# Patient Record
Sex: FEMALE | Race: WHITE
[De-identification: names, ages, dates, MRNs, and addresses within clinical notes are randomized per-mention and may not be internally consistent; named-entity substitution may affect disease eponyms.]

---

## 2017-09-05 ENCOUNTER — HOSPITAL ENCOUNTER (OUTPATIENT)
Dept: HOSPITAL 75 - ER | Age: 22
LOS: 2 days | Discharge: HOME | End: 2017-09-07
Attending: SURGERY
Payer: COMMERCIAL

## 2017-09-05 ENCOUNTER — HOSPITAL ENCOUNTER (OUTPATIENT)
Dept: HOSPITAL 75 - LAB | Age: 22
End: 2017-09-05
Attending: NURSE PRACTITIONER
Payer: COMMERCIAL

## 2017-09-05 VITALS — HEIGHT: 66 IN | WEIGHT: 293 LBS | BODY MASS INDEX: 47.09 KG/M2

## 2017-09-05 DIAGNOSIS — E66.01: ICD-10-CM

## 2017-09-05 DIAGNOSIS — R10.31: ICD-10-CM

## 2017-09-05 DIAGNOSIS — R10.9: Primary | ICD-10-CM

## 2017-09-05 DIAGNOSIS — D50.9: ICD-10-CM

## 2017-09-05 DIAGNOSIS — K44.9: Primary | ICD-10-CM

## 2017-09-05 DIAGNOSIS — E03.9: ICD-10-CM

## 2017-09-05 LAB
%HYPO/RBC NFR BLD AUTO: SLIGHT %
ALBUMIN SERPL-MCNC: 4 GM/DL (ref 3.2–4.5)
ALT SERPL-CCNC: 23 U/L (ref 0–55)
ANION GAP SERPL CALC-SCNC: 13 MMOL/L (ref 5–14)
ANISOCYTOSIS BLD QL SMEAR: SLIGHT
AST SERPL-CCNC: 16 U/L (ref 5–34)
BASOPHILS # BLD AUTO: 0 10^3/UL (ref 0–0.1)
BASOPHILS # BLD AUTO: 0 10^3/UL (ref 0–0.1)
BASOPHILS NFR BLD AUTO: 0 % (ref 0–10)
BASOPHILS NFR BLD AUTO: 0 % (ref 0–10)
BASOPHILS NFR BLD MANUAL: 0 %
BILIRUB SERPL-MCNC: 0.4 MG/DL (ref 0.1–1)
BILIRUB UR QL STRIP: NEGATIVE
BUN SERPL-MCNC: 11 MG/DL (ref 7–18)
BUN/CREAT SERPL: 14
CALCIUM SERPL-MCNC: 9.4 MG/DL (ref 8.5–10.1)
CHLORIDE SERPL-SCNC: 107 MMOL/L (ref 98–107)
CO2 SERPL-SCNC: 19 MMOL/L (ref 21–32)
CREAT SERPL-MCNC: 0.76 MG/DL (ref 0.6–1.3)
EOSINOPHIL # BLD AUTO: 0.2 10^3/UL (ref 0–0.3)
EOSINOPHIL # BLD AUTO: 0.2 10^3/UL (ref 0–0.3)
EOSINOPHIL NFR BLD AUTO: 1 % (ref 0–10)
EOSINOPHIL NFR BLD AUTO: 1 % (ref 0–10)
EOSINOPHIL NFR BLD MANUAL: 0 %
ERYTHROCYTE [DISTWIDTH] IN BLOOD BY AUTOMATED COUNT: 18.3 % (ref 10–14.5)
ERYTHROCYTE [DISTWIDTH] IN BLOOD BY AUTOMATED COUNT: 18.5 % (ref 10–14.5)
GFR SERPLBLD BASED ON 1.73 SQ M-ARVRAT: > 60 ML/MIN
GLUCOSE SERPL-MCNC: 91 MG/DL (ref 70–105)
KETONES UR QL STRIP: NEGATIVE
LEUKOCYTE ESTERASE UR QL STRIP: NEGATIVE
LYMPHOCYTES # BLD AUTO: 3.3 X 10^3 (ref 1–4)
LYMPHOCYTES # BLD AUTO: 4.2 X 10^3 (ref 1–4)
LYMPHOCYTES NFR BLD AUTO: 28 % (ref 12–44)
LYMPHOCYTES NFR BLD AUTO: 31 % (ref 12–44)
MCH RBC QN AUTO: 19 PG (ref 25–34)
MCH RBC QN AUTO: 19 PG (ref 25–34)
MCHC RBC AUTO-ENTMCNC: 30 G/DL (ref 32–36)
MCHC RBC AUTO-ENTMCNC: 30 G/DL (ref 32–36)
MCV RBC AUTO: 62 FL (ref 80–99)
MCV RBC AUTO: 63 FL (ref 80–99)
MICROCYTES BLD QL SMEAR: (no result)
MONOCYTES # BLD AUTO: 0.6 X 10^3 (ref 0–1)
MONOCYTES # BLD AUTO: 0.7 X 10^3 (ref 0–1)
MONOCYTES NFR BLD AUTO: 5 % (ref 0–12)
MONOCYTES NFR BLD AUTO: 5 % (ref 0–12)
NEUTROPHILS # BLD AUTO: 7.8 X 10^3 (ref 1.8–7.8)
NEUTROPHILS # BLD AUTO: 8.3 X 10^3 (ref 1.8–7.8)
NEUTROPHILS NFR BLD AUTO: 62 % (ref 42–75)
NEUTROPHILS NFR BLD AUTO: 65 % (ref 42–75)
NEUTS BAND NFR BLD MANUAL: 70 %
NEUTS BAND NFR BLD: 0 %
NITRITE UR QL STRIP: NEGATIVE
PH UR STRIP: 6 [PH] (ref 5–9)
PLATELET # BLD: 597 10^3/UL (ref 130–400)
PLATELET # BLD: 607 10^3/UL (ref 130–400)
PMV BLD AUTO: 8.9 FL (ref 7.4–10.4)
PMV BLD AUTO: 9.3 FL (ref 7.4–10.4)
POTASSIUM SERPL-SCNC: 3.8 MMOL/L (ref 3.6–5)
PROT SERPL-MCNC: 7.5 GM/DL (ref 6.4–8.2)
PROT UR QL STRIP: NEGATIVE
RBC # BLD AUTO: 4.79 10^6/UL (ref 4.35–5.85)
RBC # BLD AUTO: 4.82 10^6/UL (ref 4.35–5.85)
SODIUM SERPL-SCNC: 139 MMOL/L (ref 135–145)
SP GR UR STRIP: 1.01 (ref 1.02–1.02)
UROBILINOGEN UR-MCNC: NORMAL MG/DL
VARIANT LYMPHS NFR BLD MANUAL: 27 %
WBC # BLD AUTO: 11.9 10^3/UL (ref 4.3–11)
WBC # BLD AUTO: 13.4 10^3/UL (ref 4.3–11)
WBC #/AREA URNS HPF: (no result) /HPF

## 2017-09-05 PROCEDURE — 85027 COMPLETE CBC AUTOMATED: CPT

## 2017-09-05 PROCEDURE — 82728 ASSAY OF FERRITIN: CPT

## 2017-09-05 PROCEDURE — 83540 ASSAY OF IRON: CPT

## 2017-09-05 PROCEDURE — 96374 THER/PROPH/DIAG INJ IV PUSH: CPT

## 2017-09-05 PROCEDURE — 74177 CT ABD & PELVIS W/CONTRAST: CPT

## 2017-09-05 PROCEDURE — 85025 COMPLETE CBC W/AUTO DIFF WBC: CPT

## 2017-09-05 PROCEDURE — 82607 VITAMIN B-12: CPT

## 2017-09-05 PROCEDURE — 36415 COLL VENOUS BLD VENIPUNCTURE: CPT

## 2017-09-05 PROCEDURE — 82274 ASSAY TEST FOR BLOOD FECAL: CPT

## 2017-09-05 PROCEDURE — 80048 BASIC METABOLIC PNL TOTAL CA: CPT

## 2017-09-05 PROCEDURE — 81000 URINALYSIS NONAUTO W/SCOPE: CPT

## 2017-09-05 PROCEDURE — 76830 TRANSVAGINAL US NON-OB: CPT

## 2017-09-05 PROCEDURE — 80053 COMPREHEN METABOLIC PANEL: CPT

## 2017-09-05 PROCEDURE — 83690 ASSAY OF LIPASE: CPT

## 2017-09-05 PROCEDURE — 85007 BL SMEAR W/DIFF WBC COUNT: CPT

## 2017-09-05 PROCEDURE — 85045 AUTOMATED RETICULOCYTE COUNT: CPT

## 2017-09-05 PROCEDURE — 82746 ASSAY OF FOLIC ACID SERUM: CPT

## 2017-09-05 PROCEDURE — 82150 ASSAY OF AMYLASE: CPT

## 2017-09-05 PROCEDURE — 84703 CHORIONIC GONADOTROPIN ASSAY: CPT

## 2017-09-05 NOTE — XMS REPORT
Jorge Carson MD

 Created on: 2016



Vivian Crandall

External Reference #: [id number]

: 1995

Sex: Female



Demographics







 Address  [address] [place] [address]  [address]

 

 Home Phone  [phone]

 

 Preferred Language  Unknown

 

 Marital Status  Unknown

 

 Jew Affiliation  Unknown

 

 Race  White

 

 Ethnic Group  Not  or 





Author







 Author  Jorge Carson

 

 Organization  [employer]

 

 Address  Unknown

 

 Phone  Unavailable







Care Team Providers







 Care Team Member Name  Role  Phone

 

 Jorge Carson  CP  Unavailable



                                                                



Allergies

          No Known Allergies                                                   
                                     



Problems

          





 Problem Type  Condition  Code  Onset Dates  Condition Status

 

 Problem  Lumbar sprain and strain  847.2     Active



                                                                               
         



Medications

          





 Medication  Code System  Code  Instructions  Start Date  End Date  Status  
Dosage

 

 Fabiola  NDC  [address]-23  0.5 % Externally once  
     apply as directed



                                                                              



Results

          No Known Results                                                     
               



Summary Purpose

          [employer] Submission

## 2017-09-05 NOTE — XMS REPORT
Jorge Carson MD

 Created on: 2016



Vivian Crandall

External Reference #: [id number]

: 1995

Sex: Female



Demographics







 Address  [address] [place] [address]  [address]

 

 Home Phone  [phone]

 

 Preferred Language  Unknown

 

 Marital Status  Unknown

 

 Yazidism Affiliation  Unknown

 

 Race  White

 

 Ethnic Group  Not  or 





Author







 Author  Jorge Carson

 

 Organization  [employer]

 

 Address  Unknown

 

 Phone  Unavailable







Care Team Providers







 Care Team Member Name  Role  Phone

 

 Jorge Carson  CP  Unavailable



                                                                



Allergies

          No Known Allergies                                                   
                                     



Problems

          





 Problem Type  Condition  Code  Onset Dates  Condition Status

 

 Problem  Lumbar sprain and strain  847.2     Active



                                                                               
         



Medications

          





 Medication  Code System  Code  Instructions  Start Date  End Date  Status  
Dosage

 

 Fabiola  NDC  [address]-98  0.5 % Externally once  
     apply as directed



                                                                              



Results

          No Known Results                                                     
               



Summary Purpose

          [employer] Submission

## 2017-09-05 NOTE — XMS REPORT
Jorge Carson MD

 Created on: 2015



Vivian Crandall

External Reference #: [id number]

: 1995

Sex: Female



Demographics







 Address  [address] [place] [address]  [address]

 

 Home Phone  [phone]

 

 Preferred Language  Unknown

 

 Marital Status  Unknown

 

 Anglican Affiliation  Unknown

 

 Race  White

 

 Ethnic Group  Not  or 





Author







 Author  Victoriano Schwarz

 

 Organization  [employer]

 

 Address  Unknown

 

 Phone  Unavailable







Care Team Providers







 Care Team Member Name  Role  Phone

 

 Victoriano Schwarz  CP  Unavailable



                                                                



Allergies, Adverse Reactions, Alerts

          





 Substance  Reaction  Event Type

 

 Prilosec  Info Not Available  Drug Allergy



                                                                               
         



Problems

          





 Problem Type  Condition  ICD-9 Code  Onset Dates  Condition Status

 

 Assessment  Acute upper respiratory infections of unspecified site  465.9     
Active

 

 Problem  Lumbar sprain and strain  847.2     Active



                                                                               
                   



Medications

          





 Medication  Code System  Code  Instructions  Start Date  End Date  Status  
Dosage

 

 Clonidine HCl  NDC  [address]-10  0.2 MG          Active  TAKE 1 TABLET BY 
MOUTH AT BEDTIME

 

 Clindamycin Phos-Benzoyl Perox  NDC  [address]-64  1-5 %          Active  
APPLY TOPICALLY TWICE DAILY

 

 Levothroid  ND  0  50 MCG          Active  TAKE 1 TABLET BY MOUTH ON AN EMPTY 
STOMACH IN THE MORNING

 

 Ranitidine HCl  NDC  [address]-23  300 MG Orally twice a day  2014 
    Active  1 tablet

 

 Mupirocin  NDC  [address]-30  2 % Externally Three times a day  May 05, 2014 
    Active  1 application to affected area

 

 Tri-Sprintec  NDC  [address]-25  0.18/0.215/0.25 MG-35 MCG          Active  
TAKE 1 TABLET BY MOUTH EVERY DAY

 

 Hydrocodone-Acetaminophen  NDC  [address]-44  5-325 MG Orally every 6 hrs  
Oct 07, 2014  2014  Active  1 tablet as needed

 

 Indomethacin  NDC  [address]-91  50 MG Orally Up to Three times a day  Sept 15
, 2014     Active  1 capsule with food for pain

 

 Amitriptyline HCl  NDC  [address]-35  50 MG          Active  TAKE 1 TABLET BY 
MOUTH AT BEDTIME



                                                                               
                                                                               
          



Procedures

          





 Procedure  Coding System  Code  Date

 

 Office Visit, Est Pt., Level 3  CPT-4  07300  2014



                                                                               
                   



Vital Signs

          





 Date/Time:  2014

 

 Wt Percentile  99.44 %

 

 Ht Percentile  76.54 %

 

 BMI  42.28 Index

 

 BMIPercentile  99.19 %

 

 Weight  262 lbs

 

 Height  66 in

 

 Oximetry  98 %

 

 Cardiac Monitoring Heart Rate  88 /min

 

 Blood Pressure Diastolic  78 mm Hg

 

 Blood Pressure Systolic  128 mm Hg

 

 Respiratory Rate  20 /min

 

 Temperature  97.7 F



                                                                              



Results

          No Known Results                                                     
               



Summary Purpose

          [employer] Submission

## 2017-09-05 NOTE — XMS REPORT
Jorge Carson MD

 Created on: 2017



Vivian Crandall

External Reference #: [id number]

: 1995

Sex: Female



Demographics







 Address  [address] [place] [address]  [address]

 

 Preferred Language  Unknown

 

 Marital Status  Unknown

 

 Sabianist Affiliation  Unknown

 

 Race  Unknown

 

 Ethnic Group  Unknown





Author







 Author  Jorge Carson

 

 Organization  [facility]

 

 Address  [address]

[place]  [address]



 

 Phone  [phone]







Care Team Providers







 Care Team Member Name  Role  Phone

 

 Jorge Carson  Unavailable  [phone]







PROBLEMS







 Type  Condition  ICD9-CM Code  PVZ44-YG Code  Onset Dates  Condition Status  
SNOMED Code

 

 Problem  Lumbar sprain and strain  847.2        Active  [id number]







ALLERGIES

Unknown Allergies



SOCIAL HISTORY

No smoking Hx information available



PLAN OF CARE





VITAL SIGNS





MEDICATIONS







 Medication  Instructions  Dosage  Frequency  Start Date  End Date  Duration  
Status

 

 Levothyroxine Sodium 50 MCG     TAKE 1 TABLET BY MOUTH ON AN EMPTY STOMACH 
EVERY MORNING           90  Active







RESULTS

No Results



PROCEDURES

No Known procedures



IMMUNIZATIONS

No Known Immunizations

## 2017-09-05 NOTE — XMS REPORT
Jorge Carson MD

 Created on: 2015



Vivian Crandall

External Reference #: [id number]

: 1995

Sex: Female



Demographics







 Address  [address] [place] [address]  [address]

 

 Home Phone  [phone]

 

 Preferred Language  Unknown

 

 Marital Status  Unknown

 

 Pentecostalism Affiliation  Unknown

 

 Race  White

 

 Ethnic Group  Not  or 





Author







 Author  Victoriano Schwarz

 

 Organization  [employer]

 

 Address  Unknown

 

 Phone  Unavailable







Care Team Providers







 Care Team Member Name  Role  Phone

 

 Victoriano Schwarz  CP  Unavailable



                                                                



Allergies, Adverse Reactions, Alerts

          





 Substance  Reaction  Event Type

 

 Prilosec  Info Not Available  Drug Allergy



                                                                               
         



Problems

          





 Problem Type  Condition  Code  Onset Dates  Condition Status

 

 Assessment  Lumbar sprain and strain  847.2     Active

 

 Problem  Lumbar sprain and strain  847.2     Active



                                                                               
                   



Medications

          





 Medication  Code System  Code  Instructions  Start Date  End Date  Status  
Dosage

 

 Levothroid  NDC  0  50 MCG             TAKE 1 TABLET BY MOUTH ON AN EMPTY 
STOMACH IN THE MORNING

 

 Cyclobenzaprine HCl  NDC  [address]-13  10 MG Orally Three times a day       1 tablet

 

 Amitriptyline HCl  NDC  24697304524  50 MG             TAKE 1 TABLET BY MOUTH 
AT BEDTIME

 

 Tri-Sprintec  NDC  [address]-36  0.18/0.215/0.25 MG-35 MCG             TAKE 1 
TABLET BY MOUTH EVERY DAY

 

 Levothyroxine Sodium  NDC  74822755104  50 MCG             TAKE 1 TABLET BY 
MOUTH ON AN EMPTY STOMACH IN THE MORNING

 

 Clonidine HCl  NDC  23627623239  0.2 MG             TAKE 1 TABLET BY MOUTH AT 
BEDTIME

 

 Etodolac  NDC  [address]-58  400 MG Orally Three times a day       1 tablet

 

 Ranitidine HCl  NDC  [address]-33  300 MG Orally twice a day  2014 
       1 tablet



                                                                               
                                                                               



Procedures

          





 Procedure  Coding System  Code  Date

 

 Office Visit, Est Pt., Level 3  CPT-4  29290  2015



                                                                               
                   



Vital Signs

          





 Date/Time:  2015

 

 Ht Percentile  81.68 %

 

 BMI  40.70 Index

 

 Wt Percentile  99.36 %

 

 Weight  256 lbs

 

 Height  66.5 in

 

 Oximetry  98 %

 

 Cardiac Monitoring Heart Rate  76 /min

 

 Blood Pressure Diastolic  80 mm Hg

 

 Blood Pressure Systolic  128 mm Hg

 

 BMIPercentile  98.96 %

 

 Respiratory Rate  20 /min



                                                                              



Results

          No Known Results                                                     
               



Summary Purpose

          [employer] Submission

## 2017-09-05 NOTE — XMS REPORT
Jorge Carson MD

 Created on: 2015



Vivian Crandall

External Reference #: [id number]

: 1995

Sex: Female



Demographics







 Address  [address] [place] [address]  [address]

 

 Home Phone  [phone]

 

 Preferred Language  Unknown

 

 Marital Status  Unknown

 

 Jehovah's witness Affiliation  Unknown

 

 Race  White

 

 Ethnic Group  Not  or 





Author







 Author  Victoriano Schwarz

 

 Organization  [employer]

 

 Address  Unknown

 

 Phone  Unavailable







Care Team Providers







 Care Team Member Name  Role  Phone

 

 Victoriano Schwarz  CP  Unavailable



                                                                



Allergies, Adverse Reactions, Alerts

          





 Substance  Reaction  Event Type

 

 Prilosec  Info Not Available  Drug Allergy



                                                                               
         



Problems

          





 Problem Type  Condition  ICD-9 Code  Onset Dates  Condition Status

 

 Assessment  Acute maxillary sinusitis  461.0     Active

 

 Assessment  Nevus, non-neoplastic  448.1     Active

 

 Problem  Lumbar sprain and strain  847.2     Active



                                                                               
                             



Medications

          





 Medication  Code System  Code  Instructions  Start Date  End Date  Status  
Dosage

 

 Levothyroxine Sodium  NDC  23224857556  50 MCG             TAKE 1 TABLET BY 
MOUTH ON AN EMPTY STOMACH IN THE MORNING

 

 Tri-Sprintec  ND  [address]-10  0.18/0.215/0.25 MG-35 MCG             TAKE 1 
TABLET BY MOUTH EVERY DAY

 

 Levothroid  NDC  0  50 MCG             TAKE 1 TABLET BY MOUTH ON AN EMPTY 
STOMACH IN THE MORNING

 

 Amoxicillin  NDC  [address]-49  500 MG Orally Three times a day  Susan 30, 
2015  July 10, 2015     1 capsule

 

 Clonidine HCl  NDC  25549206180  0.2 MG             TAKE 1 TABLET BY MOUTH AT 
BEDTIME

 

 Ranitidine HCl  NDC  [address]-67  300 MG Orally twice a day  2014 
       1 tablet

 

 Amitriptyline HCl  NDC  05883169230  50 MG             TAKE 1 TABLET BY MOUTH 
AT BEDTIME



                                                                               
                                                                     



Procedures

          





 Procedure  Coding System  Code  Date

 

 Office Visit, Est Pt., Level 3  CPT-4  32839  2015



                                                                               
                   



Vital Signs

          





 Date/Time:  2015

 

 Ht Percentile  75.87 %

 

 BMI  41.31 Index

 

 Wt Percentile  99.36 %

 

 Weight  256 lbs

 

 Height  66 in

 

 Oximetry  99 %

 

 Cardiac Monitoring Heart Rate  100 /min

 

 Blood Pressure Diastolic  80 mm Hg

 

 Blood Pressure Systolic  122 mm Hg

 

 BMIPercentile  98.99 %

 

 Respiratory Rate  20 /min



                                                                              



Results

          No Known Results                                                     
               



Summary Purpose

          [employer] Submission

## 2017-09-05 NOTE — XMS REPORT
Jorge Carson MD

 Created on: 2015



Vivian Crandall

External Reference #: [id number]

: 1995

Sex: Female



Demographics







 Address  [address] [place] [address]  [address]

 

 Home Phone  [phone]

 

 Preferred Language  Unknown

 

 Marital Status  Unknown

 

 Restorationist Affiliation  Unknown

 

 Race  White

 

 Ethnic Group  Not  or 





Author







 Author  Victoriano Schwarz

 

 Organization  [employer]

 

 Address  Unknown

 

 Phone  Unavailable







Care Team Providers







 Care Team Member Name  Role  Phone

 

 Victoriano Schwarz  CP  Unavailable



                                                                



Allergies, Adverse Reactions, Alerts

          





 Substance  Reaction  Event Type

 

 Prilosec  Info Not Available  Drug Allergy



                                                                               
         



Problems

          





 Problem Type  Condition  ICD-9 Code  Onset Dates  Condition Status

 

 Assessment  Acute upper respiratory infections of unspecified site  465.9     
Active

 

 Problem  Lumbar sprain and strain  847.2     Active



                                                                               
                   



Medications

          





 Medication  Code System  Code  Instructions  Start Date  End Date  Status  
Dosage

 

 Ranitidine HCl  NDC  [address]-90  300 MG Orally twice a day  2014 
    Active  1 tablet

 

 Tri-Sprintec  NDC  [address]-15  0.18/0.215/0.25 MG-35 MCG          Active  
TAKE 1 TABLET BY MOUTH EVERY DAY

 

 Clindamycin Phos-Benzoyl Perox  NDC  [address]-64  1-5 %          Active  
APPLY TOPICALLY TWICE DAILY

 

 Mupirocin  NDC  [address]-60  2 % Externally Three times a day  May 05, 2014 
    Active  1 application to affected area

 

 Clonidine HCl  NDC  [address]-10  0.2 MG          Active  TAKE 1 TABLET BY 
MOUTH AT BEDTIME

 

 Levothroid  NDC  0  50 MCG          Active  TAKE 1 TABLET BY MOUTH ON AN EMPTY 
STOMACH IN THE MORNING

 

 Amitriptyline HCl  NDC  [address]-46  50 MG          Active  TAKE 1 TABLET BY 
MOUTH AT BEDTIME

 

 Indomethacin  NDC  [address]-79  50 MG Orally Up to Three times a day  Sept 15
, 2014     Active  1 capsule with food for pain

 

 Cephalexin  NDC  [address]-58  500 MG Orally three times a day    Active  1 capsule



                                                                               
                                                                               
          



Procedures

          





 Procedure  Coding System  Code  Date

 

 Office Visit, Est Pt., Level 3  CPT-4  39020  2014



                                                                               
                   



Vital Signs

          





 Date/Time:  2014

 

 Wt Percentile  99.46 %

 

 Ht Percentile  76.54 %

 

 BMI  42.61 Index

 

 BMIPercentile  99.21 %

 

 Weight  264 lbs

 

 Height  66 in

 

 Oximetry  99 %

 

 Cardiac Monitoring Heart Rate  100 /min

 

 Blood Pressure Diastolic  78 mm Hg

 

 Blood Pressure Systolic  132 mm Hg

 

 Respiratory Rate  20 /min

 

 Temperature  98.4 F



                                                                              



Results

          No Known Results                                                     
               



Summary Purpose

          [employer] Submission

## 2017-09-06 VITALS — DIASTOLIC BLOOD PRESSURE: 70 MMHG | SYSTOLIC BLOOD PRESSURE: 123 MMHG

## 2017-09-06 VITALS — SYSTOLIC BLOOD PRESSURE: 97 MMHG | DIASTOLIC BLOOD PRESSURE: 62 MMHG

## 2017-09-06 VITALS — SYSTOLIC BLOOD PRESSURE: 118 MMHG | DIASTOLIC BLOOD PRESSURE: 76 MMHG

## 2017-09-06 VITALS — SYSTOLIC BLOOD PRESSURE: 133 MMHG | DIASTOLIC BLOOD PRESSURE: 80 MMHG

## 2017-09-06 VITALS — SYSTOLIC BLOOD PRESSURE: 111 MMHG | DIASTOLIC BLOOD PRESSURE: 55 MMHG

## 2017-09-06 VITALS — SYSTOLIC BLOOD PRESSURE: 111 MMHG | DIASTOLIC BLOOD PRESSURE: 56 MMHG

## 2017-09-06 LAB
%HYPO/RBC NFR BLD AUTO: SLIGHT %
%SAT TOTAL IRON BINDING CAPIC: 5 % (ref 15–50)
AMYLASE SERPL-CCNC: 38 U/L (ref 25–125)
ANION GAP SERPL CALC-SCNC: 13 MMOL/L (ref 5–14)
ANISOCYTOSIS BLD QL SMEAR: SLIGHT
BASOPHILS # BLD AUTO: 0 10^3/UL (ref 0–0.1)
BASOPHILS NFR BLD AUTO: 0 % (ref 0–10)
BASOPHILS NFR BLD MANUAL: 0 %
BUN SERPL-MCNC: 12 MG/DL (ref 7–18)
BUN/CREAT SERPL: 16
CALCIUM SERPL-MCNC: 9.1 MG/DL (ref 8.5–10.1)
CHLORIDE SERPL-SCNC: 108 MMOL/L (ref 98–107)
CO2 SERPL-SCNC: 17 MMOL/L (ref 21–32)
CREAT SERPL-MCNC: 0.75 MG/DL (ref 0.6–1.3)
EOSINOPHIL # BLD AUTO: 0.2 10^3/UL (ref 0–0.3)
EOSINOPHIL NFR BLD AUTO: 2 % (ref 0–10)
EOSINOPHIL NFR BLD AUTO: 3 % (ref 0–10)
EOSINOPHIL NFR BLD AUTO: 3 % (ref 0–10)
EOSINOPHIL NFR BLD MANUAL: 2 %
ERYTHROCYTE [DISTWIDTH] IN BLOOD BY AUTOMATED COUNT: 18.1 % (ref 10–14.5)
ERYTHROCYTE [DISTWIDTH] IN BLOOD BY AUTOMATED COUNT: 18.1 % (ref 10–14.5)
ERYTHROCYTE [DISTWIDTH] IN BLOOD BY AUTOMATED COUNT: 18.4 % (ref 10–14.5)
GFR SERPLBLD BASED ON 1.73 SQ M-ARVRAT: > 60 ML/MIN
GLUCOSE SERPL-MCNC: 98 MG/DL (ref 70–105)
LIPASE SERPL-CCNC: 12 U/L (ref 8–78)
LYMPHOCYTES # BLD AUTO: 3.2 X 10^3 (ref 1–4)
LYMPHOCYTES # BLD AUTO: 3.2 X 10^3 (ref 1–4)
LYMPHOCYTES # BLD AUTO: 3.5 X 10^3 (ref 1–4)
LYMPHOCYTES NFR BLD AUTO: 39 % (ref 12–44)
MCH RBC QN AUTO: 18 PG (ref 25–34)
MCHC RBC AUTO-ENTMCNC: 29 G/DL (ref 32–36)
MCV RBC AUTO: 63 FL (ref 80–99)
MICROCYTES BLD QL SMEAR: (no result)
MONOCYTES # BLD AUTO: 0.5 X 10^3 (ref 0–1)
MONOCYTES NFR BLD AUTO: 6 % (ref 0–12)
NEUTROPHILS # BLD AUTO: 4.2 X 10^3 (ref 1.8–7.8)
NEUTROPHILS # BLD AUTO: 4.2 X 10^3 (ref 1.8–7.8)
NEUTROPHILS # BLD AUTO: 4.7 X 10^3 (ref 1.8–7.8)
NEUTROPHILS NFR BLD AUTO: 52 % (ref 42–75)
NEUTROPHILS NFR BLD AUTO: 52 % (ref 42–75)
NEUTROPHILS NFR BLD AUTO: 53 % (ref 42–75)
NEUTS BAND NFR BLD MANUAL: 54 %
NEUTS BAND NFR BLD: 0 %
PLATELET # BLD: 484 10^3/UL (ref 130–400)
PLATELET # BLD: 484 10^3/UL (ref 130–400)
PLATELET # BLD: 505 10^3/UL (ref 130–400)
PMV BLD AUTO: 8.6 FL (ref 7.4–10.4)
PMV BLD AUTO: 8.6 FL (ref 7.4–10.4)
PMV BLD AUTO: 9 FL (ref 7.4–10.4)
POTASSIUM SERPL-SCNC: 3.6 MMOL/L (ref 3.6–5)
RBC # BLD AUTO: 4.18 10^6/UL (ref 4.35–5.85)
RBC # BLD AUTO: 4.18 10^6/UL (ref 4.35–5.85)
RBC # BLD AUTO: 4.29 10^6/UL (ref 4.35–5.85)
RETICS/RBC NFR: 1.45 % (ref 0.5–2.4)
SODIUM SERPL-SCNC: 138 MMOL/L (ref 135–145)
TIBC SERPL-MCNC: 377 UG/DL (ref 280–380)
UIBC SERPL-MCNC: 358 UG/DL (ref 55–450)
VARIANT LYMPHS NFR BLD MANUAL: 40 %
WBC # BLD AUTO: 8.1 10^3/UL (ref 4.3–11)
WBC # BLD AUTO: 8.1 10^3/UL (ref 4.3–11)
WBC # BLD AUTO: 8.9 10^3/UL (ref 4.3–11)

## 2017-09-06 RX ADMIN — Medication SCH ML: at 21:16

## 2017-09-06 RX ADMIN — DEXTROSE MONOHYDRATE AND SODIUM CHLORIDE SCH MLS/HR: 5; .45 INJECTION, SOLUTION INTRAVENOUS at 18:12

## 2017-09-06 RX ADMIN — Medication SCH ML: at 05:23

## 2017-09-06 RX ADMIN — DEXTROSE MONOHYDRATE AND SODIUM CHLORIDE SCH MLS/HR: 5; .45 INJECTION, SOLUTION INTRAVENOUS at 03:49

## 2017-09-06 RX ADMIN — DEXTROSE MONOHYDRATE AND SODIUM CHLORIDE SCH MLS/HR: 5; .45 INJECTION, SOLUTION INTRAVENOUS at 10:11

## 2017-09-06 RX ADMIN — DEXTROSE MONOHYDRATE AND SODIUM CHLORIDE SCH MLS/HR: 5; .45 INJECTION, SOLUTION INTRAVENOUS at 17:05

## 2017-09-06 RX ADMIN — Medication SCH ML: at 13:41

## 2017-09-06 NOTE — ED ABDOMINAL PAIN
General


Chief Complaint:  Abdominal/GI Problems


Stated Complaint:  ABDOMINAL PAIN


Nursing Triage Note:  


RLQ abdomen pain x 5 days with n/v. patient reports being evaluated by [facility] 

today 


and having labs drawn here


Sepsis Screen:  No Definite Risk


Source of Information:  Patient





History of Present Illness


Time Seen By Provider:  23:10


Initial Comments


PT ARRIVES VIA POV FROM HOME


C/O RLQ PAIN SINCE FRIDAY 09/01/17--CONSTANT AND GRADUALLY GETTING WORSE


PAIN IS SEVERE TODAY


PAIN IS MUCH WORSE WITH MOVEMENTS, WALKING, LAYING FLAT AND OUTSTRETCHED, AND 

WITH DEEP BREATHS/COUGH/SNEEZING


+ NAUSEA AND VOMITING, BEGAN TODAY--HAS VOMITED X 5 TODAY


NO DIARRHEA, HAD A NORMAL BM TODAY


BEGAN RUNNING FEVER TODAY 100.5


NO URINARY SYMPTOMS


LMP 08/12/17, NORMAL. OFF BIRTH CONTROL FOR 4-5 MONTHS


NO HISTORY OF SIMILAR





SEEN AT [facility] TODAY FOR THIS PROBLEM--UA DONE AND WAS ESSENTIALLY NORMAL. 

SENT HERE FOR OUTPATIENT LAB, WHICH WAS DONE AT 1900 TONIGHT. RESULTS UNKNOWN 

TO PT. NO FOLLOW UP APPOINTMENT MADE.





PCP: [facility], [facility]





Allergies and Home Medications


Allergies


Coded Allergies:  


     omeprazole (Verified  Allergy, Unknown, 9/5/17)





Home Medications


Levothyroxine Sodium 50 Mcg Tablet, 50 MCG PO, (Reported)





Review of Systems


Constitutional:  see HPI, fever


EENTM:  No Symptoms Reported


Respiratory:  No Symptoms Reported


Cardiovascular:  No Symptoms Reported


Gastrointestinal:  See HPI, Abdominal Pain, Denies Constipated, Denies Diarrhea

, Nausea, Poor Appetite, Poor Fluid Intake, Vomiting


Genitourinary:  No Symptoms Reported


Musculoskeletal:  no symptoms reported


Skin:  no symptoms reported


Psychiatric/Neurological:  No Symptoms Reported


Endocrine:  No Symptoms Reported





Past Medical-Social-Family Hx


Patient Social History


Alcohol Use:  Denies Use


Recreational Drug Use:  No


Smoking Status:  Never a Smoker


Recent Foreign Travel:  No


Contact w/Someone Who Travel:  No


Recent Infectious Disease Expo:  No


Recent Hopitalizations:  No


Physical Abuse:  No


Sexual Abuse:  No





Surgeries


History of Surgeries:  Yes


Surgeries:  Adenoidectomy, Gallbladder, Tonsillectomy





Respiratory


History of Respiratory Disorde:  No





Cardiovascular


History of Cardiac Disorders:  No





Neurological


History of Neurological Disord:  No





Reproductive System


Female Reproductive Disorders:  Denies





Genitourinary


History of Genitourinary Disor:  No





Gastrointestinal


History of Gastrointestinal Di:  No





Musculoskeletal


History of Musculoskeletal Dis:  No





Endocrine


History of Endocrine Disorders:  Yes (OBESITY)


Endocrine Disorders:  Hypothyroidsim





HEENT


History of HEENT Disorders:  No


HEENT Disorders:  Tonsilitis





Cancer


History of Cancer:  No





Psychosocial


History of Psychiatric Problem:  No


Suicide Risk Score:  0





Integumentary


History of Skin or Integumenta:  No





Blood Transfusions


History of Blood Disorders:  No





Physical Exam


Vital Signs





 VS - Last 72 Hours, by Label








 9/5/17





 22:50


 


Temp 99.5


 


Pulse 97


 


Resp 18


 


B/P (MAP) 127/70





Capillary Refill : Less Than 3 Seconds


General Appearance:  obese, other (WALKS SLOWLY SLIGHTLY BENT AT WAIST, HOLDING 

RLQ. WHEN SITTING, SITS ON EDGE OF BED. UNABLE TO LAY FLAT/OUTSTRETCHED DUE TO 

PAIN )


HEENT:  PERRL/EOMI, other (FACE VERY FLUSHED)


Neck:  normal inspection


Respiratory:  normal breath sounds, no respiratory distress, no accessory 

muscle use


Cardiovascular:  regular rate, rhythm, no murmur


Gastrointestinal:  normal bowel sounds, soft, No distended, guarding, rebound, 

tenderness (MARKED RLQ TENDERNESS WITH REBOUND; MILD TENDERNESS TO SUPRAPUBIC 

AREA, RIGHT MID AND UPPER ABDOMEN AND RIGHT FLANK. EQUIVOCAL HEEL TAP, ROVSING'S

, OBTURATOR AND PSOAS. ), No hernia, No mass


Back:  CVA tenderness (R)


Neurologic/Psychiatric:  CNs II-XII nml as tested, no motor/sensory deficits, 

alert, normal mood/affect, oriented x 3


Skin:  normal color (FACE FLUSHED), warm/dry





Progress/Results/Core Measures


Results/Orders


Lab Results





Laboratory Tests








Test


  9/5/17


23:00 9/5/17


23:40 Range/Units


 


 


Urine Color YELLOW    


 


Urine Clarity CLEAR    


 


Urine pH 6   5-9  


 


Urine Specific Gravity 1.010 L  1.016-1.022  


 


Urine Protein NEGATIVE   NEGATIVE  


 


Urine Glucose (UA) NEGATIVE   NEGATIVE  


 


Urine Ketones NEGATIVE   NEGATIVE  


 


Urine Nitrite NEGATIVE   NEGATIVE  


 


Urine Bilirubin NEGATIVE   NEGATIVE  


 


Urine Urobilinogen NORMAL   NORMAL  MG/DL


 


Urine Leukocyte Esterase NEGATIVE   NEGATIVE  


 


Urine RBC (Auto) NEGATIVE   NEGATIVE  


 


Urine RBC NONE    /HPF


 


Urine WBC NONE    /HPF


 


Urine Squamous Epithelial


Cells 10-25 H


  


   /HPF


 


 


Urine Crystals NONE    /LPF


 


Urine Bacteria TRACE    /HPF


 


Urine Casts NONE    /LPF


 


Urine Mucus NEGATIVE    /LPF


 


Urine Culture Indicated NO    


 


Urine Pregnancy Test NEGATIVE   NEGATIVE  


 


White Blood Count


  


  13.4 H


  4.3-11.0


10^3/uL


 


Red Blood Count


  


  4.82 


  4.35-5.85


10^6/uL


 


Hemoglobin  9.0 L 11.5-16.0  G/DL


 


Hematocrit  30 L 35-52  %


 


Mean Corpuscular Volume  62 L 80-99  FL


 


Mean Corpuscular Hemoglobin  19 L 25-34  PG


 


Mean Corpuscular Hemoglobin


Concent 


  30 L


  32-36  G/DL


 


 


Red Cell Distribution Width  18.5 H 10.0-14.5  %


 


Platelet Count


  


  607 H


  130-400


10^3/uL


 


Mean Platelet Volume  9.3  7.4-10.4  FL


 


Neutrophils (%) (Auto)  62  42-75  %


 


Lymphocytes (%) (Auto)  31  12-44  %


 


Monocytes (%) (Auto)  5  0-12  %


 


Eosinophils (%) (Auto)  1  0-10  %


 


Basophils (%) (Auto)  0  0-10  %


 


Neutrophils # (Auto)  8.3 H 1.8-7.8  X 10^3


 


Lymphocytes # (Auto)  4.2 H 1.0-4.0  X 10^3


 


Monocytes # (Auto)  0.7  0.0-1.0  X 10^3


 


Eosinophils # (Auto)


  


  0.2 


  0.0-0.3


10^3/uL


 


Basophils # (Auto)


  


  0.0 


  0.0-0.1


10^3/uL


 


Amylase Level  38    U/L


 


Lipase  12  8-78  U/L








My Orders





Orders - YASEMIN MORRIS DO


Saline Lock/Iv-Start (9/5/17 23:15)


Urine Pregnancy Bedside (9/5/17 23:15)


Amylase (9/5/17 23:15)


Cbc With Automated Diff (9/5/17 23:15)


Lipase (9/5/17 23:15)


Ua Culture If Indicated (9/5/17 23:15)


Hcg,Qualitative Urine (9/5/17 23:42)


Ct Abd/Pelv W (Appendicitis) (9/6/17 00:01)


Iohexol Injection (Omnipaque 350 Mg/Ml 1 (9/6/17 00:15)


Ns (Ivpb) (Sodium Chloride 0.9% Ivpb Bag (9/6/17 00:15)


Us Non Ob Transvaginal [id number] (9/6/17 00:23)


Ketorolac Injection (Toradol Injection) (9/6/17 01:45)





Medications Given in ED





Current Medications








 Medications  Dose


 Ordered  Sig/Vincent


 Route  Start Time


 Stop Time Status Last Admin


Dose Admin


 


 Iohexol  100 ml  ONCE  ONCE


 IV  9/6/17 00:15


 9/6/17 00:16 DC 9/6/17 00:15


100 ML


 


 Ketorolac


 Tromethamine  30 mg  ONCE  ONCE


 IVP  9/6/17 01:45


 9/6/17 01:46 DC 9/6/17 02:10


30 MG


 


 Sodium Chloride  100 ml  ONCE  ONCE


 IV  9/6/17 00:15


 9/6/17 00:16 DC 9/6/17 00:16


80 ML








Vital Signs/I&O





Vital Sign - Last 12Hours








 9/5/17





 22:50


 


Temp 99.5


 


Pulse 97


 


Resp 18


 


B/P (MAP) 127/70














Blood Pressure Mean:  89











Diagnostic Imaging





Comments


CT ABDOMEN/PELVIS--PORTIONS OF APPENDIX THAT ARE VISIBLE APPEAR NORMAL, 

PROMINENT RLQ MESENTERIC NODES, LEFT OVARIAN CYST--PER [facility] VIA FAX @ 0107


PELVIC ULTRASOUND--NON-DIAGNOSTIC, UNABLE TO VISUALIZE OVARIES, 7MM ENDOMETRIUM-

-PER [facility] VIA FAX @ 0203. TECH REPORT--NO FREE FLUID IN PELVIS.


   Reviewed:  Reviewed by Me





Departure


Communication (Admissions)


Progress Notes


0223--SPOKE WITH DR. HIGGINS, SURGEON ON CALL. ACCEPTS PT FOR ADMIT. WILL REPEAT 

LAB IN AM AND KEEP NPO





Impression


Impression:  


 Primary Impression:  


 RLQ abdominal pain


Disposition:  09 ADMITTED AS INPATIENT


Condition:  Stable





Admissions


Decision to Admit Reason:  Admit from ER (General)


Decision to Admit/Date:  Sep 6, 2017


Time/Decision to Admit Time:  02:25





Departure-Patient Inst.


Referrals:  


NO,LOCAL PHYSICIAN (PCP/Family)


Primary Care Physician











YASEMIN MORRIS DO Sep 6, 2017 01:54

## 2017-09-06 NOTE — CONSULTATION
History of Present Illness


History of Present Illness


Patient Consulted On(angie/time)


9/6/17


 17:56


Date Seen by Provider:  Sep 6, 2017


Time Seen by Provider:  09:00


History of Present Illness


Patient presents with RLQ pain with nausea and vomiting x 5 days and has hx of 

known anemia. Admits to low grade fever. Denies diarrhea and constipation. She 

is not currently taking any birth control and pregnancy test is negative. She's 

having rlq abdominal pain for 5 days that worsened last night  6-7/10 no 

radiation of pain.  Had nausea and emesis. Moving into certain positions makes 

pain worse. Nothing makes it better. Had ct scan demonstrating normal appendix.

  wbc 13 k last night with repeat this am normal range.  was admitted for 

observation.  u/s did not visualize ovaries.





Allergies and Home Medications


Allergies


Coded Allergies:  


     omeprazole (Verified  Allergy, Unknown, 9/5/17)


Uncoded Allergies:  


     BLUE CHEESE (Allergy, Unknown, 9/6/17)





Home Medications


Levothyroxine Sodium 50 Mcg Tablet, 50 MCG PO DAILY, (Reported)





Past Medical-Social-Family Hx


Patient Social History


Alcohol Use:  Occasionally Uses


Recreational Drug Use:  No


Smoking Status:  Never a Smoker


Recent Foreign Travel:  No


Contact w/Someone Who Travel:  No


Recent Infectious Disease Expo:  No


Recent Hopitalizations:  No


Physical Abuse Screen:  No


Sexual Abuse:  No





Seasonal Allergies


Seasonal Allergies:  No





Surgeries


History of Surgeries:  Yes


Surgeries:  Adenoidectomy, Gallbladder, Tonsillectomy





Respiratory


History of Respiratory Disorde:  No





Cardiovascular


History of Cardiac Disorders:  No





Neurological


History of Neurological Disord:  Yes





Reproductive System


Sexually Transmitted Disease:  No


HIV/AIDS:  No


Female Reproductive Disorders:  Menstrual Problems





Genitourinary


History of Genitourinary Disor:  No





Gastrointestinal


History of Gastrointestinal Di:  Yes (GALLBLADDER REMOVAL)





Musculoskeletal


History of Musculoskeletal Dis:  No





Endocrine


History of Endocrine Disorders:  Yes


Endocrine Disorders:  Hypothyroidsim





HEENT


History of HEENT Disorders:  No


HEENT Disorders:  Tonsilitis





Cancer


History of Cancer:  No





Psychosocial


History of Psychiatric Problem:  Yes


Behavioral Health Disorders:  ADD/ADHD





Integumentary


History of Skin or Integumenta:  No





Blood Transfusions


History of Blood Disorders:  No





Family Medical History


Significant Family History:  No Pertinent Family Hx





Review of Systems-General


Constitutional:  see HPI


EENTM:  no symptoms reported


Respiratory:  no symptoms reported


Cardiovascular:  no symptoms reported


Gastrointestinal:  see HPI


Genitourinary:  no symptoms reported


Musculoskeletal:  no symptoms reported


Skin:  no symptoms reported


Psychiatric/Neurological:  No Symptoms Reported





Physical Exam-General Problems


Physical Exam


Vital Signs





Vital Sign - Last 12Hours








 9/5/17 9/6/17





 22:50 03:25


 


Temp 99.5 


 


Pulse 97 


 


Resp 18 


 


B/P (MAP) 127/70 


 


Pulse Ox  100


 


O2 Delivery  Room Air





Capillary Refill : Less Than 3 SecondsLess Than 3 Seconds


General Appearance:  no apparent distress


HEENT:  PERRL/EOMI


Neck:  supple, normal inspection


Respiratory:  no respiratory distress, no accessory muscle use


Cardiovascular:  regular rate, rhythm


Gastrointestinal:  soft (some tenderness right lower quadrant, no guarding or 

rebounding, no organomegaly, no palpable masses)


Rectal:  deferred


Back:  normal inspection


Extremities:  non-tender, normal inspection


Neurologic/Psychiatric:  alert, normal mood/affect, oriented x 3


Skin:  warm/dry





Data Review


Labs


Laboratory Tests


9/5/17 23:00: 


Urine Color YELLOW, Urine Clarity CLEAR, Urine pH 6, Urine Specific Gravity 

1.010L, Urine Protein NEGATIVE, Urine Glucose (UA) NEGATIVE, Urine Ketones 

NEGATIVE, Urine Nitrite NEGATIVE, Urine Bilirubin NEGATIVE, Urine Urobilinogen 

NORMAL, Urine Leukocyte Esterase NEGATIVE, Urine RBC (Auto) NEGATIVE, Urine RBC 

NONE, Urine WBC NONE, Urine Squamous Epithelial Cells 10-25H, Urine Crystals 

NONE, Urine Bacteria TRACE, Urine Casts NONE, Urine Mucus NEGATIVE, Urine 

Culture Indicated NO, Urine Pregnancy Test NEGATIVE


9/5/17 23:40: 


White Blood Count 13.4H, Red Blood Count 4.82, Hemoglobin 9.0L, Hematocrit 30L, 

Mean Corpuscular Volume 62L, Mean Corpuscular Hemoglobin 19L, Mean Corpuscular 

Hemoglobin Concent 30L, Red Cell Distribution Width 18.5H, Platelet Count 607H, 

Mean Platelet Volume 9.3, Neutrophils (%) (Auto) 62, Lymphocytes (%) (Auto) 31, 

Monocytes (%) (Auto) 5, Eosinophils (%) (Auto) 1, Basophils (%) (Auto) 0, 

Neutrophils # (Auto) 8.3H, Lymphocytes # (Auto) 4.2H, Monocytes # (Auto) 0.7, 

Eosinophils # (Auto) 0.2, Basophils # (Auto) 0.0, Amylase Level 38, Lipase 12


9/6/17 06:15: 


White Blood Count 8.9, Red Blood Count 4.29L, Hemoglobin 7.9L, Hematocrit 27L, 

Mean Corpuscular Volume 63L, Mean Corpuscular Hemoglobin 18L, Mean Corpuscular 

Hemoglobin Concent 29L, Red Cell Distribution Width 18.4H, Platelet Count 505H, 

Mean Platelet Volume 9.0, Neutrophils (%) (Auto) 53, Lymphocytes (%) (Auto) 39, 

Monocytes (%) (Auto) 6, Eosinophils (%) (Auto) 2, Basophils (%) (Auto) 0, 

Neutrophils # (Auto) 4.7, Lymphocytes # (Auto) 3.5, Monocytes # (Auto) 0.5, 

Eosinophils # (Auto) 0.2, Basophils # (Auto) 0.0, Sodium Level 138, Potassium 

Level 3.6, Chloride Level 108H, Carbon Dioxide Level 17L, Anion Gap 13, Blood 

Urea Nitrogen 12, Creatinine 0.75, Estimat Glomerular Filtration Rate > 60, BUN/

Creatinine Ratio 16, Glucose Level 98, Calcium Level 9.1


9/6/17 08:00: 


White Blood Count 8.1, Red Blood Count 4.18L, Hemoglobin 7.7L, Hematocrit 26L, 

Mean Corpuscular Volume 63L, Mean Corpuscular Hemoglobin 18L, Mean Corpuscular 

Hemoglobin Concent 29L, Red Cell Distribution Width 18.1H, Platelet Count 484H, 

Mean Platelet Volume 8.6, Neutrophils (%) (Auto) 52, Lymphocytes (%) (Auto) 39, 

Monocytes (%) (Auto) 6, Eosinophils (%) (Auto) 3, Basophils (%) (Auto) 0, 

Neutrophils # (Auto) 4.2, Lymphocytes # (Auto) 3.2, Monocytes # (Auto) 0.5, 

Eosinophils # (Auto) 0.2, Basophils # (Auto) 0.0, Neutrophils % (Manual) 54, 

Lymphocytes % (Manual) 40, Monocytes % (Manual) 4, Eosinophils % (Manual) 2, 

Basophils % (Manual) 0, Band Neutrophils 0, Hypochromasia SLIGHT, Anisocytosis 

SLIGHT, Microcytosis MODERATE, Elliptocytes SLIGHT, Absolute Reticulocyte Count 

61, Percent Reticulocyte Count 1.45, Iron Level 19L, Total Iron Binding 

Capacity 377, Unsaturated Iron Binding Capacity 358, Transferrin % Saturation 5L





Assessment/Plan


rlq abdominal pain


nausea vomiting


ct scan appendix is visualized and normal


repeat cbc white count normal


will have u/s redone


there is a little more stool in right colon will try miralax and liquids if 

continues to feel better today


anemia-will order blood work and discussed with Dr. Ryder who will see in 

the morning.





Clinical Quality Measures


DVT/VTE Risk/Contraindication:


RFS Level Per Nursing on Admit:  1=Low/No VTE PPX











LINDSEY HIGGINS DO Sep 6, 2017 18:03

## 2017-09-06 NOTE — DIAGNOSTIC IMAGING REPORT
EXAMINATION:

Pelvic ultrasound.

 

INDICATION: 

Abnormal CT scan.



FINDINGS:

The CT abdomen/pelvis exam performed earlier today raised the

question of a collapsing left ovarian follicular cyst. The pelvic

ultrasound exam performed following the CT exam offered poor

visualization of the adnexa. 



This study was also technically difficult to accomplish due to

the presence of bowel gas and to the patient's body habitus. 



The uterus is nongravid and not enlarged measuring 8.3 x 4.5 x

4.8 cm. The endometrial lining is thickened measuring 12 mm

(normal 5 mm or less). This finding is nonspecific; however,

correlation with the patient's menstrual cycle would be

recommended. 



Both ovaries are identified. There is blood flow to each ovary.

There does appear to be a 1.9 x 1.4 x 1.3 cm cyst associated with

the left ovary. This cyst has a generally benign appearance. The

right ovary is unremarkable. There is no pelvic mass or free

fluid collection noted. 



IMPRESSION: 

There does appear to be a small 1.9 x 1.4 x 1.3 cm

benign-appearing cyst associated with the left ovary. There is no

acute pelvic abnormality noted otherwise. 



Dictated by: 



  Dictated on workstation # [id number]

## 2017-09-06 NOTE — DIAGNOSTIC IMAGING REPORT
INDICATION: Abnormal CT scan with complex left ovarian cyst.



FINDINGS: Uterus was not adequately measured. Endometrial stripe

is mildly thickened at 7-10 mm. The adnexa are not demonstrated

on this study.



IMPRESSION: Very limited study. Due to findings on CT scan with

question of left adnexal cyst, would recommend repeat ultrasound

with sonographic measurements of the uterus and ovaries.



CRITICAL FINDINGS.



Report was called and faxed to ER charge nurse Nemo @ [facility] [place], [place] @ 7:41 AM/mushtaq.



Dictated by: 



  Dictated on workstation # [id number]

## 2017-09-06 NOTE — DIAGNOSTIC IMAGING REPORT
PROCEDURE: CT abdomen and pelvis with contrast, rule out

appendicitis.



TECHNIQUE: Multiple contiguous axial images were obtained through

the abdomen and pelvis after the administration of intravenous

contrast.



INDICATION: Right lower quadrant pain x5 days.



COMPARISON: None



FINDINGS: Included portions of the lung bases are clear.



CT abdomen: Small bowel loops are nondistended. Normal appendix

is identified. The kidneys, adrenal glands, spleen, pancreas, and

liver have a normal CT appearance. There is no loculated fluid

collection, free fluid, nor free air within the abdomen. No

abnormal mesenteric or retroperitoneal adenopathy is seen. Bony

structures show no acute abnormalities.



CT pelvis: Urinary bladder is essentially nondistended, but is

otherwise grossly unremarkable. There is probable collapsing

ovarian follicle or cyst involving the left ovary. There is no

loculated fluid collection, free fluid, nor free air within the

abdomen. No abnormal adenopathy is seen. Bony structures show no

acute abnormalities.



IMPRESSION:

1. Normal appendix.

2. Probable collapsing left ovarian follicle or cyst.











Dictated by: 



  Dictated on workstation # [id number]

## 2017-09-07 VITALS — DIASTOLIC BLOOD PRESSURE: 70 MMHG | SYSTOLIC BLOOD PRESSURE: 156 MMHG

## 2017-09-07 VITALS — SYSTOLIC BLOOD PRESSURE: 111 MMHG | DIASTOLIC BLOOD PRESSURE: 51 MMHG

## 2017-09-07 VITALS — SYSTOLIC BLOOD PRESSURE: 95 MMHG | DIASTOLIC BLOOD PRESSURE: 59 MMHG

## 2017-09-07 LAB
BASOPHILS # BLD AUTO: 0 10^3/UL (ref 0–0.1)
BASOPHILS NFR BLD AUTO: 0 % (ref 0–10)
EOSINOPHIL # BLD AUTO: 0.1 10^3/UL (ref 0–0.3)
EOSINOPHIL NFR BLD AUTO: 2 % (ref 0–10)
ERYTHROCYTE [DISTWIDTH] IN BLOOD BY AUTOMATED COUNT: 18.2 % (ref 10–14.5)
FERRITIN SERPL-MCNC: 10 NG/ML (ref 15–150)
FOLATE SERPL-MCNC: 14.8 NG/ML (ref 1.5–24)
LYMPHOCYTES # BLD AUTO: 2.3 X 10^3 (ref 1–4)
LYMPHOCYTES NFR BLD AUTO: 36 % (ref 12–44)
MCH RBC QN AUTO: 18 PG (ref 25–34)
MCHC RBC AUTO-ENTMCNC: 29 G/DL (ref 32–36)
MCV RBC AUTO: 64 FL (ref 80–99)
MONOCYTES # BLD AUTO: 0.4 X 10^3 (ref 0–1)
MONOCYTES NFR BLD AUTO: 7 % (ref 0–12)
NEUTROPHILS # BLD AUTO: 3.4 X 10^3 (ref 1.8–7.8)
NEUTROPHILS NFR BLD AUTO: 55 % (ref 42–75)
PLATELET # BLD: 436 10^3/UL (ref 130–400)
PMV BLD AUTO: 9.3 FL (ref 7.4–10.4)
RBC # BLD AUTO: 4.08 10^6/UL (ref 4.35–5.85)
WBC # BLD AUTO: 6.3 10^3/UL (ref 4.3–11)

## 2017-09-07 RX ADMIN — Medication SCH ML: at 14:49

## 2017-09-07 RX ADMIN — Medication SCH ML: at 05:59

## 2017-09-07 RX ADMIN — DEXTROSE MONOHYDRATE AND SODIUM CHLORIDE SCH MLS/HR: 5; .45 INJECTION, SOLUTION INTRAVENOUS at 08:17

## 2017-09-07 RX ADMIN — DEXTROSE MONOHYDRATE AND SODIUM CHLORIDE SCH MLS/HR: 5; .45 INJECTION, SOLUTION INTRAVENOUS at 02:15

## 2017-09-07 NOTE — DISCHARGE INST-SIMPLE/STANDARD
Discharge Inst-Standard


Patient Instructions/Follow Up


Plan of Care/Instructions/FU:  


Follow up with Dr. Hdez 1 week.


Follow up with Dr. Ryder within 1 week at [facility].


May take tylenol or motrin over the counter for pain.


If change in condition be re-evaluated by your phycisians or by emergency


department.


Activity as Tolerated:  Yes


Discharge Diet:  Soft Diet


Other Inst to Patient





Symptoms to Report:


Appetite Changes, Extremity Discoloration, Numbness/Tingling, Swelling Increased

, Bleeding Excessive, Eyesight Changes, Pain Increased, Urine Color Change, 

Constipation(Persistent), Fever over 101 degree F, Pain/Pressure in chest, 

Urinating Difficulty, Cough Up/Vomit Blood, Heart Beat Irreg/Pounding, Pain/

Pressure in jaw, Vaginal Bleeding Increase, Cramps in feet or legs, 

Lightheadedness, Pain/Pressure in shoulder, Diarrhea(Persistent), Memory 

Changes Suddenly, Questions/Concerns, Weight gain consecutive days, Dizziness/

Fainting, Nausea/Vomiting, Shortness of Breath, Weight gain over 2 pounds








If questions or concerns contact your physician 


Or seek help at emergency department.











LINDSEY HDEZ DO Sep 7, 2017 13:59

## 2017-09-07 NOTE — OPERATIVE REPORT
DATE OF SERVICE:  09/07/2017



PREOPERATIVE DIAGNOSES:

Right lower quadrant abdominal pain, iron-deficiency anemia, and occult positive

stool.



POSTOPERATIVE DIAGNOSES:

Hiatal hernia and possible reflux esophagitis.



PROCEDURE:

Esophagogastroduodenoscopy with biopsy of the gastroesophageal junction.



SURGEON:

Lindsey Hdez DO



ANESTHESIA:

Per CRNA.



ESTIMATED BLOOD LOSS:

None.



COMPLICATIONS:

None.



INDICATIONS:

The patient is a 22-year-old female, who presented with right lower quadrant

abdominal pain.  She was found to have occult positive stool and was found to be

anemic.  Her hemoglobin has been stable in the 7 range.  She is not symptomatic

from her anemia.  She understands risks and benefits of having EGD performed. 

She understands and wishes to proceed.  Consent was signed and in the chart.



DESCRIPTION OF PROCEDURE:

The patient was taken to the endoscopy suite, placed in left lateral recumbent

position.  Timeout was performed.  Scope was inserted in mouth, down the

esophagus, stomach and into the duodenum without difficulty.  There were no

polyps, masses, ulcerations, or any active bleeding or ulcers within the

duodenum visualized.  Scope was then continued slowly retracted back until the

stomach, which was further insufflated.  There are no polyps, masses,

ulcerations or erythematous changes present within the stomach.  Scope was

retroflexed noting a small hiatal hernia.  No other pathology noted.  Scope was

then returned to its normal position, slowly withdrawn back into the distal

esophagus.  Some slight erythematous changes were present at the GE junction,

possibly related to reflux esophagitis.  Biopsy of the GE junction was obtained.

 Scope was then slowly retracted back noting no other pathology.  The patient

tolerated the procedure well without any complications.  She was taken to

recovery room in stable condition.



RECOMMENDATIONS:

The patient with no active bleeding of the upper GI tract.  We did take biopsy. 

We will have her follow up in 1 week to discuss results.  The patient could be

followed outpatient for her iron-deficiency anemia.  She will follow up with Dr. Ryder approximately in a week and myself in approximately a week.  We will

likely need colonoscopy for further evaluation.  This could be done on an

outpatient basis.  If the patient has any worsening of condition, she should be

reevaluated at that time.





Job ID: [id number]

DocumentID: [id number]

Dictated Date:  09/07/2017 14:03:56

Transcription Date: 09/07/2017 15:07:32

Dictated By: LINDSEY HDEZ DO

MTDD

## 2017-09-07 NOTE — PHYSICIAN PROGRESS NOTE
Standard Progress Note


Progress Notes/Assess & Plan


Date Seen


9/7/17


Time Seen by Provider:  08:15


Assess & Plan/Chief Complaint


pt feeling a little better. Awaiting EGD. Iron studies are consistent with iron 

deficiency anemia thus far. She may require iron injections as she says she has

  been taking iron pills. Agree with getting an EGD and 


I think she will need a colonoscopy eventually. The elevated platelet count is c

/w iron deficiency as well


Labs


Laboratory Tests


9/5/17 23:40








9/6/17 06:15








9/6/17 08:00








9/7/17 07:45











Short Stay Final Diagnosis


1. Abdominal pain- uncertain etiology as of yet


2. iron deficiency anemia- consider outpatient injections


3. Hemoccult positive blood





Agree with current treatment and plan





Copy


Copies To 1:   ADE SULLIVAN MD, KATHLEEN M MD Sep 7, 2017 08:21

## 2017-09-07 NOTE — PROGRESS NOTE-POST OPERATIVE
Post-Operative Progess Note


Surgeon (s)/Assistant (s)


Surgeon


LINDSEY HIGGINS DO


Assistant:  na





Pre-Operative Diagnosis


occult + stool, rlq abdominal pain





Post-Operative Diagnosis





hiatal hernia question reflux esophagitis





Procedure & Operative Findings


Date of Procedure


9/7/17


Procedure Performed/Findings


egd c biopsy ge junction


Anesthesia Type


per crna





Estimated Blood Loss


Estimated blood loss (mL):  none





Specimens/Packing


Specimens Removed


 ge junction











LINDSEY HIGGINS DO Sep 7, 2017 13:55

## 2017-09-07 NOTE — PROGRESS NOTE
Subjective


Date Seen by Provider:  Sep 7, 2017


Time Seen by Provider:  07:48


Subjective/Events-last exam


Patient with less pain today.  Pain is better than yesterday.  Had occult + 

stool. 


Hgb 7.5   No nausea or emesis today.  Denies fever sweats chills shortness of 

breath or chest pain.





Objective


Exam





Vital Signs








  Date Time  Temp Pulse Resp B/P (MAP) Pulse Ox O2 Delivery O2 Flow Rate FiO2


 


9/7/17 03:38 98.6 71 16 95/59 98 Room Air  


 


9/6/17 23:20 98.7 72 18 111/55 98 Room Air  


 


9/6/17 20:50 97.4 78 20 97/62 99 Room Air  


 


9/6/17 16:15 97.6 83 18 118/76 99 Room Air  


 


9/6/17 13:30      Room Air  


 


9/6/17 12:51 98.4 80 20 111/56 99 Room Air  





Capillary Refill : Less Than 3 SecondsLess Than 3 Seconds


General Appearance:  No Apparent Distress


Neck:  Normal Inspection, Non Tender


Respiratory:  No Accessory Muscle Use, No Respiratory Distress


Gastrointestinal:  normal bowel sounds, soft, No distended, guarding, rebound, 

tenderness (minimal right lower quadrant tenderness no guarding  or rebounding, 

no palpable masses), No hernia, No mass


Neurologic/Psychiatric:  Alert, Oriented x3


Skin:  Warm/Dry





Results


Lab


Laboratory Tests


9/6/17 20:00: Stool Occult Blood Immunoassay POSITIVEH


9/7/17 07:45: 


White Blood Count 6.3, Red Blood Count 4.08L, Hemoglobin 7.5L, Hematocrit 26L, 

Mean Corpuscular Volume 64L, Mean Corpuscular Hemoglobin 18L, Mean Corpuscular 

Hemoglobin Concent 29L, Red Cell Distribution Width 18.2H, Platelet Count 436H, 

Mean Platelet Volume 9.3, Neutrophils (%) (Auto) 55, Lymphocytes (%) (Auto) 36, 

Monocytes (%) (Auto) 7, Eosinophils (%) (Auto) 2, Basophils (%) (Auto) 0, 

Neutrophils # (Auto) 3.4, Lymphocytes # (Auto) 2.3, Monocytes # (Auto) 0.4, 

Eosinophils # (Auto) 0.1, Basophils # (Auto) 0.0





Assessment/Plan


rlq abdominal pain


nausea vomiting


ct scan appendix is visualized and normal


repeat cbc white count normal


iron def anemia-will order blood work and discussed with Dr. Ryder who will 

see this morning


plan EGD for furthe evaluation   may need colonoscopy in near future, could be 

done as outpatient


will need outpatient follow up with PCP and myself.





Clinical Quality Measures


DVT/VTE Risk/Contraindication:


RFS Level Per Nursing on Admit:  1=Low/No VTE PPX











LINDSEY HIGGINS DO Sep 7, 2017 09:03

## 2017-10-19 ENCOUNTER — HOSPITAL ENCOUNTER (OUTPATIENT)
Dept: HOSPITAL 75 - LABNPT | Age: 22
End: 2017-10-19
Attending: INTERNAL MEDICINE
Payer: COMMERCIAL

## 2017-10-19 DIAGNOSIS — Z53.29: Primary | ICD-10-CM

## 2018-10-16 ENCOUNTER — HOSPITAL ENCOUNTER (EMERGENCY)
Dept: HOSPITAL 75 - ER | Age: 23
Discharge: HOME | End: 2018-10-16
Payer: SELF-PAY

## 2018-10-16 VITALS — HEIGHT: 66 IN | BODY MASS INDEX: 47.09 KG/M2 | WEIGHT: 293 LBS

## 2018-10-16 VITALS — SYSTOLIC BLOOD PRESSURE: 163 MMHG | DIASTOLIC BLOOD PRESSURE: 97 MMHG

## 2018-10-16 DIAGNOSIS — F90.9: ICD-10-CM

## 2018-10-16 DIAGNOSIS — F12.10: ICD-10-CM

## 2018-10-16 DIAGNOSIS — Z90.89: ICD-10-CM

## 2018-10-16 DIAGNOSIS — J40: Primary | ICD-10-CM

## 2018-10-16 DIAGNOSIS — E03.9: ICD-10-CM

## 2018-10-16 DIAGNOSIS — Z88.8: ICD-10-CM

## 2018-10-16 DIAGNOSIS — Z77.22: ICD-10-CM

## 2018-10-16 PROCEDURE — 71046 X-RAY EXAM CHEST 2 VIEWS: CPT

## 2018-10-16 NOTE — ED RESPIRATORY
General


Chief Complaint:  Chest Wall/Rib Pain


Stated Complaint:  PAIN IN CHEST AND BACK WHEN SHE BREATHES


Source:  patient


Exam Limitations:  no limitations





History of Present Illness


Date Seen by Provider:  Oct 16, 2018


Time Seen by Provider:  22:23


Initial Comments


Patient is a 23-year-old female who presents to the emergency room with 

complaints of productive cough for 3 weeks, and 2 days ago she developed pain 

in her chest and back with inspiration. She denies any shortness of breath, 

fevers, lightheadedness.


Timing/Duration:  other (2 days)





Allergies and Home Medications


Allergies


Coded Allergies:  


     omeprazole (Verified  Allergy, Unknown, 9/5/17)


Uncoded Allergies:  


     BLUE CHEESE (Allergy, Unknown, 9/6/17)





Home Medications


Ranitidine HCl 150 Mg Tablet, Unknown Dose PO PRN, (Reported)





Patient Home Medication List


Home Medication List Reviewed:  Yes





Review of Systems


Review of Systems


Constitutional:  see HPI; No chills, No fever


Respiratory:  see HPI, cough


Cardiovascular:  see HPI, other (chest wall tenderness and upper back 

tenderness.)





All Other Systems Reviewed


Negative Unless Noted:  Yes





Past Medical-Social-Family Hx


Past Med/Social Hx:  Reviewed Nursing Past Med/Soc Hx


Patient Social History


Alcohol Use:  Occasionally Uses


Recreational Drug Use:  Yes


Drug of Choice:  CANNIBUS


Smoking Status:  Never a Smoker


2nd Hand Smoke Exposure:  Yes


Recent Foreign Travel:  No


Contact w/Someone Who Travel:  No


Recent Hopitalizations:  No





Immunizations Up To Date


Tetanus Booster (TDap):  Unknown





Seasonal Allergies


Seasonal Allergies:  No





Past Medical History


Surgeries:  Yes


Adenoidectomy, Gallbladder, Tonsillectomy


Respiratory:  No


Cardiac:  No


Neurological:  No


Female Reproductive Disorders:  Menstrual Problems


Sexually Transmitted Disease:  No


HIV/AIDS:  No


Genitourinary:  No


Gastrointestinal:  Yes (GALLBLADDER REMOVAL)


Musculoskeletal:  No


Endocrine:  Yes


Hypothyroidsim


HEENT:  No


Tonsilitis


Cancer:  No


Psychosocial:  Yes


ADD/ADHD


Integumentary:  No


Blood Disorders:  No





Family Medical History


Reviewed Nursing Family Hx


No Pertinent Family Hx





Physical Exam





Vital Signs - First Documented








 10/16/18





 22:10


 


Temp 98.9


 


Pulse 107


 


Resp 18


 


B/P (MAP) 163/97 (119)


 


Pulse Ox 98


 


O2 Delivery Room Air





Capillary Refill :


Height: 5'6.00"


Weight: 302lbs. 4.0oz. [phone]; 48.8 BMI


Method:Stated


General Appearance:  WD/WN, no apparent distress


Neck:  non-tender, full range of motion, supple, normal inspection, carotid 

bruit


Respiratory:  chest non-tender, lungs clear, normal breath sounds, no 

respiratory distress, no accessory muscle use, respiratory distress


Cardiovascular:  normal peripheral pulses, regular rate, rhythm, no edema, no 

gallop, no JVD, no murmur


Neurologic/Psychiatric:  alert, normal mood/affect, oriented x 3


Skin:  normal color, warm/dry





Progress/Results/Core Measures


Suspected Sepsis


SIRS


Temperature: 


Pulse:  


Respiratory Rate: 


 


Blood Pressure  / 


Mean:





Results/Orders


My Orders





Orders - SAGAR MONROE


Chest Pa/Lat (2 View) (10/16/18 22:17)





Vital Signs/I&O











 10/16/18





 22:10


 


Temp 98.9


 


Pulse 107


 


Resp 18


 


B/P (MAP) 163/97 (119)


 


Pulse Ox 98


 


O2 Delivery Room Air





Capillary Refill :





Departure


Impression





 Primary Impression:  


 Bronchitis


Disposition:  01 HOME, SELF-CARE


Condition:  Stable/Unchanged





Departure-Patient Inst.


Decision time for Depature:  22:51


Referrals:  


[facility] (PCP)


Primary Care Physician


Patient Instructions:  Acute Bronchitis, Adult (DC)





Add. Discharge Instructions:  


You may use over-the-counter cold cough and flu medications to aid with cough 

relief, throat lozenges cough drops may also be beneficial. Take medications as 

directed. Cool mist humidifiers made loosening secretions. Practice good hand 

hygiene. Drink plenty of clear liquids like water. Tylenol and ibuprofen as 

directed on the bottle for pain. Return back to the emergency room for any 

worsening symptoms or concerns as needed. Follow-up with Dr. Ryder at [facility] 

[facility] within 1 week for recheck or no improvement. All discharge instructions 

reviewed with patient and/or family. Voiced understanding.


Scripts


Methylprednisolone (Medrol) 4 Mg Tab.ds.pk


4 MG PO UD, #1 PKG


   Prov: SAGAR MONROE         10/16/18











SAGAR MONROE Oct 16, 2018 22:28

## 2018-10-17 NOTE — DIAGNOSTIC IMAGING REPORT
INDICATION: Cough and congestion for 3 weeks.



PA and lateral chest.



FINDINGS: The lungs are well-aerated and clear. Heart is not

enlarged. There is no pulmonary edema. No pneumothorax or pleural

effusions. No bony abnormalities.



IMPRESSION: Normal PA and lateral chest.



Dictated by: 



  Dictated on workstation # [id number]